# Patient Record
Sex: MALE | Race: WHITE | NOT HISPANIC OR LATINO | Employment: OTHER | URBAN - METROPOLITAN AREA
[De-identification: names, ages, dates, MRNs, and addresses within clinical notes are randomized per-mention and may not be internally consistent; named-entity substitution may affect disease eponyms.]

---

## 2017-12-12 ENCOUNTER — ALLSCRIPTS OFFICE VISIT (OUTPATIENT)
Dept: OTHER | Facility: OTHER | Age: 32
End: 2017-12-12

## 2017-12-15 NOTE — PROGRESS NOTES
not have any rectal plain when when he has the bleeding but he does have abdominal crampingHe does not currently have any abdominal pain or rectal bleeding      Active Problems  1  Rectal bleeding (569 3) (K62 5)    Past Medical History  Active Problems And Past Medical History Reviewed: The active problems and past medical history were reviewed and updated today  Surgical History  1  Denied: History Of Prior Surgery  Surgical History Reviewed: The surgical history was reviewed and updated today  Family History  Family History    1  Unknown family medical history  Family History Reviewed: The family history was reviewed and updated today  Social History  Social History Reviewed: The social history was reviewed and updated today  Vitals  Vital Signs    Recorded: [id number] 04:40PM   Temperature 99 F   Heart Rate 197   Systolic 284   Diastolic 84   Height 6 ft 2 in   Weight 325 lb 12 oz   BMI Calculated 41 82   BSA Calculated 2 67   O2 Saturation 94     Physical Exam   Constitutional  General appearance: No acute distress, well appearing and well nourished  obese  Eyes  Conjunctiva and lids: No swelling, erythema, or discharge  Ears, Nose, Mouth, and Throat  External inspection of ears and nose: Normal    Pulmonary  Respiratory effort: No increased work of breathing or signs of respiratory distress  Abdomen  Abdomen: Non-tender, no masses  Musculoskeletal  Gait and station: Normal    Skin  Skin and subcutaneous tissue: Normal without rashes or lesions     Psychiatric  Orientation to person, place and time: Normal    Mood and affect: Normal          Signatures   Electronically signed by : VERONICA Vasquez ; Dec 13 2017  1:58PM EST                       (Author)

## 2018-01-22 VITALS
WEIGHT: 315 LBS | OXYGEN SATURATION: 94 % | DIASTOLIC BLOOD PRESSURE: 84 MMHG | HEIGHT: 74 IN | TEMPERATURE: 99 F | BODY MASS INDEX: 40.43 KG/M2 | SYSTOLIC BLOOD PRESSURE: 144 MMHG | HEART RATE: 103 BPM

## 2018-02-16 ENCOUNTER — TRANSCRIBE ORDERS (OUTPATIENT)
Dept: GASTROENTEROLOGY | Facility: MEDICAL CENTER | Age: 33
End: 2018-02-16

## 2018-02-22 ENCOUNTER — OFFICE VISIT (OUTPATIENT)
Dept: GASTROENTEROLOGY | Facility: MEDICAL CENTER | Age: 33
End: 2018-02-22
Payer: COMMERCIAL

## 2018-02-22 VITALS — DIASTOLIC BLOOD PRESSURE: 86 MMHG | HEART RATE: 97 BPM | SYSTOLIC BLOOD PRESSURE: 124 MMHG | TEMPERATURE: 98.1 F

## 2018-02-22 DIAGNOSIS — K62.5 RECTAL BLEEDING: Primary | ICD-10-CM

## 2018-02-22 PROCEDURE — 99244 OFF/OP CNSLTJ NEW/EST MOD 40: CPT | Performed by: INTERNAL MEDICINE

## 2018-02-22 NOTE — PROGRESS NOTES
[facility] - Outpatient Consultation  Raza Bain 27 y o  male MRN: [id number]  Encounter: [id number]          ASSESSMENT AND PLAN:      There are no diagnoses linked to this encounter   ______________________________________________________________________    HPI:  ***      REVIEW OF SYSTEMS:    CONSTITUTIONAL: Denies any fever, chills, rigors, and weight loss  HEENT: No earache or tinnitus  Denies hearing loss or visual disturbances  CARDIOVASCULAR: No chest pain or palpitations  RESPIRATORY: Denies any cough, hemoptysis, shortness of breath or dyspnea on exertion  GASTROINTESTINAL: As noted in the History of Present Illness  GENITOURINARY: No problems with urination  Denies any hematuria or dysuria  NEUROLOGIC: No dizziness or vertigo, denies headaches  MUSCULOSKELETAL: Denies any muscle or joint pain  SKIN: Denies skin rashes or itching  ENDOCRINE: Denies excessive thirst  Denies intolerance to heat or cold  PSYCHOSOCIAL: Denies depression or anxiety  Denies any recent memory loss  Historical Information   No past medical history on file  No past surgical history on file  Social History   History   Alcohol use Not on file     History   Drug use: Unknown     History   Smoking Status    Not on file   Smokeless Tobacco    Not on file     No family history on file  Meds/Allergies     No current outpatient prescriptions on file  No Known Allergies        Objective     Blood pressure 124/86, pulse 97, temperature 98 1 °F (36 7 °C), temperature source Tympanic  PHYSICAL EXAM:      General Appearance:   Alert, cooperative, no distress   HEENT:   Normocephalic, atraumatic, anicteric      Neck:  Supple, symmetrical, trachea midline   Lungs:   Clear to auscultation bilaterally; no rales, rhonchi or wheezing; respirations unlabored    Heart[de-identified]   Regular rate and rhythm; no murmur, rub, or gallop     Abdomen:   Soft, non-tender, non-distended; normal bowel sounds; no masses, no organomegaly    Genitalia:   Deferred    Rectal:   Deferred    Extremities:  No cyanosis, clubbing or edema    Pulses:  2+ and symmetric    Skin:  No jaundice, rashes, or lesions    Lymph nodes:  No palpable cervical lymphadenopathy        Lab Results:   No visits with results within 1 Day(s) from this visit  Latest known visit with results is:   No results found for any previous visit  Radiology Results:   No results found  By signing my name below, I, [facility], attest that this documentation has been prepared under the direction and in the presence of Cinthia Burns DO  Electonically Signed: [facility] 2/22/2018      I, Cinthia Burns, personally performed the services described in this documenation  All medical record entries made by the scribe were at my direction and in my presence  I have reviewed the chart and discharge instructions (if applicable) and agree that the record reflects my personal performance and is accurate and complete  Cinthia Burns DO  2/22/2018

## 2018-02-22 NOTE — LETTER
February 22, 2018     Anastasiia Rapp MD  [id number]    Patient: Darrell Fuchs   YOB: 1985   Date of Visit: 2/22/2018       Dear Dr William Inch:    Thank you for referring Darrell Fuchs to me for evaluation  Below are my notes for this consultation  If you have questions, please do not hesitate to call me  I look forward to following your patient along with you           Sincerely,        Cinthia Burns,         CC: No Recipients
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
## 2018-03-14 ENCOUNTER — ANESTHESIA EVENT (OUTPATIENT)
Dept: GASTROENTEROLOGY | Facility: HOSPITAL | Age: 33
End: 2018-03-14
Payer: COMMERCIAL

## 2018-03-14 RX ORDER — SODIUM CHLORIDE 9 MG/ML
125 INJECTION, SOLUTION INTRAVENOUS CONTINUOUS
Status: CANCELLED | OUTPATIENT
Start: 2018-03-14

## 2018-03-15 ENCOUNTER — ANESTHESIA (OUTPATIENT)
Dept: GASTROENTEROLOGY | Facility: HOSPITAL | Age: 33
End: 2018-03-15
Payer: COMMERCIAL

## 2018-03-15 ENCOUNTER — HOSPITAL ENCOUNTER (OUTPATIENT)
Facility: HOSPITAL | Age: 33
Setting detail: OUTPATIENT SURGERY
Discharge: HOME | End: 2018-03-15
Attending: INTERNAL MEDICINE | Admitting: INTERNAL MEDICINE
Payer: COMMERCIAL

## 2018-03-15 VITALS
DIASTOLIC BLOOD PRESSURE: 88 MMHG | RESPIRATION RATE: 20 BRPM | BODY MASS INDEX: 40.43 KG/M2 | SYSTOLIC BLOOD PRESSURE: 151 MMHG | TEMPERATURE: 98.4 F | WEIGHT: 315 LBS | OXYGEN SATURATION: 96 % | HEIGHT: 74 IN | HEART RATE: 88 BPM

## 2018-03-15 DIAGNOSIS — K62.5 RECTAL BLEEDING: ICD-10-CM

## 2018-03-15 PROCEDURE — 88305 TISSUE EXAM BY PATHOLOGIST: CPT | Performed by: PATHOLOGY

## 2018-03-15 PROCEDURE — 45380 COLONOSCOPY AND BIOPSY: CPT | Performed by: INTERNAL MEDICINE

## 2018-03-15 RX ORDER — SODIUM CHLORIDE 9 MG/ML
125 INJECTION, SOLUTION INTRAVENOUS CONTINUOUS
Status: DISCONTINUED | OUTPATIENT
Start: 2018-03-15 | End: 2018-03-15 | Stop reason: HOSPADM

## 2018-03-15 RX ORDER — PROPOFOL 10 MG/ML
INJECTION, EMULSION INTRAVENOUS AS NEEDED
Status: DISCONTINUED | OUTPATIENT
Start: 2018-03-15 | End: 2018-03-15 | Stop reason: SURG

## 2018-03-15 RX ADMIN — LIDOCAINE HYDROCHLORIDE 100 MG: 20 INJECTION, SOLUTION INTRAVENOUS at 13:32

## 2018-03-15 RX ADMIN — PROPOFOL 100 MG: 10 INJECTION, EMULSION INTRAVENOUS at 13:37

## 2018-03-15 RX ADMIN — SODIUM CHLORIDE 125 ML/HR: 0.9 INJECTION, SOLUTION INTRAVENOUS at 12:58

## 2018-03-15 RX ADMIN — PROPOFOL 100 MG: 10 INJECTION, EMULSION INTRAVENOUS at 13:42

## 2018-03-15 RX ADMIN — PROPOFOL 200 MG: 10 INJECTION, EMULSION INTRAVENOUS at 13:32

## 2018-03-15 RX ADMIN — PROPOFOL 100 MG: 10 INJECTION, EMULSION INTRAVENOUS at 13:40

## 2018-03-15 NOTE — NURSING NOTE
No relief from sitting on toilet, pt walked back to bed, pt placed on all fours with buttocks in air, pt passed large amounts of flatus in this postion   Pt states his abd pain is a 2/10, pt reports "feeling much better "

## 2018-03-15 NOTE — OP NOTE
OPERATIVE REPORT  PATIENT NAME: Preston Sood    :  1985  MRN: [id number]  Pt Location: [facility] 01    SURGERY DATE: 3/15/2018    Surgeon(s) and Role:     * Raphael Palmer DO - Primary    Preop Diagnosis:  Rectal bleeding [K62 5]    Post-Op Diagnosis Codes:     * Rectal bleeding [K62 5]    Procedure(s) (LRB):  COLONOSCOPY with polypectomy (N/A)    Specimen(s):  ID Type Source Tests Collected by Time Destination   1 : polyp Tissue Large Intestine, Transverse Colon TISSUE EXAM Raphael Palmer DO 3/15/2018 1342        Estimated Blood Loss:   Minimal    Drains:       Anesthesia Type:   Choice    Operative Indications:  Rectal bleeding [K62 5]      Operative Findings:    Colonoscopy Procedure Note    Procedure: Colonoscopy    Sedation: Monitored anesthesia care, check anesthesia records      ASA Class: 2    INDICATIONS: Rectal Bleeding    POST-OP DIAGNOSIS: See the impression below    Procedure Details     Prior colonoscopy: No prior colonoscopy  Informed consent was obtained for the procedure, including sedation  Risks of perforation, hemorrhage, adverse drug reaction and aspiration were discussed  The patient was placed in the left lateral decubitus position  Based on the pre-procedure assessment, including review of the patient's medical history, medications, allergies, and review of systems, he had been deemed to be an appropriate candidate for conscious sedation; he was therefore sedated with the medications listed below  The patient was monitored continuously with telemetry, pulse oximetry, blood pressure monitoring, and direct observations  A rectal examination was performed  The colonoscope was inserted into the rectum and advanced under direct vision to the cecum, which was identified by the ileocecal valve and appendiceal orifice  The quality of the colonic preparation was good    A careful inspection was made as the colonoscope was withdrawn, including a retroflexed view of the rectum; findings and interventions are described below  Findings:  One polyp in the transverse colon  Biopsied with cold forceps  Complications:  None; patient tolerated the procedure well  Impression:    One polyp in the transverse colon  Biopsied with cold forceps  Recommendations:  Repeat colonoscopy in 5 years if polyp is an adenoma  rpeat colonoscopy is being recommended at an interval of less than 10 years, this is due to this polyp being an adenoma, otherwise would be at age 46      Lo Petersen DO  DATE: March 15, 2018  TIME: 2:03 PM

## 2018-03-15 NOTE — DISCHARGE INSTRUCTIONS
Findings:  One polyp in the transverse colon  Biopsied with cold forceps  Complications:  None; patient tolerated the procedure well      Impression:    One polyp in the transverse colon  Biopsied with cold forceps      Recommendations:  Repeat colonoscopy in 5 years if polyp is an adenoma  rpeat colonoscopy is being recommended at an interval of less than 10 years, this is due to this polyp being an adenoma, otherwise would be at age 46      SIGNATURE: Lynda Gonzalez, DO        Colorectal Polyps   WHAT YOU NEED TO KNOW:   Colorectal polyps are small growths of tissue in the lining of the colon and rectum  Most polyps are hyperplastic polyps and are usually benign (noncancerous)  Certain types of polyps, called adenomatous polyps, may turn into cancer  DISCHARGE INSTRUCTIONS:   Follow up with your healthcare provider or gastroenterologist as directed: You may need to return for more tests, such as another colonoscopy  Write down your questions so you remember to ask them during your visits  Reduce your risk for colorectal polyps:   · Eat a variety of healthy foods:  Healthy foods include fruit, vegetables, whole-grain breads, low-fat dairy products, beans, lean meat, and fish  Ask if you need to be on a special diet  · Maintain a healthy weight:  Ask your healthcare provider if you need to lose weight and how much you need to lose  Ask for help with a weight loss program     · Exercise:  Begin to exercise slowly and do more as you get stronger  Talk with your healthcare provider before you start an exercise program      · Limit alcohol:  Your risk for polyps increases the more you drink  · Do not smoke: If you smoke, it is never too late to quit  Ask for information about how to stop    For support and more information:   · Brenda Morrison ([facility]) [address], [place] [address]  Phone: 0- [phone] - [phone]  Web Address: www digestive  niddk nih gov  Contact your healthcare provider or gastroenterologist if:   · You have a fever  · You have chills, a cough, or feel weak and achy  · You have abdominal pain that does not go away or gets worse after you take medicine  · Your abdomen is swollen  · You are losing weight without trying  · You have questions or concerns about your condition or care  Seek care immediately or call 911 if:   · You have sudden shortness of breath  · You have a fast heart rate, fast breathing, or are too dizzy to stand up  · You have severe abdominal pain  · You see blood in your bowel movement  © 2017 [address] [employer]  Information is for End User's use only and may not be sold, redistributed or otherwise used for commercial purposes  All illustrations and images included in CareNotes® are the copyrighted property of A D [employer] M [employer] Inc  or [employer]  The above information is an  only  It is not intended as medical advice for individual conditions or treatments  Talk to your doctor, nurse or pharmacist before following any medical regimen to see if it is safe and effective for you  Colonoscopy   WHAT YOU NEED TO KNOW:   A colonoscopy is a procedure to examine the inside of your colon (intestine) with a scope  Polyps or tissue growths may have been removed during your colonoscopy  It is normal to feel bloated and to have some abdominal discomfort  You should be passing gas  If you have hemorrhoids or you had polyps removed, you may have a small amount of bleeding  DISCHARGE INSTRUCTIONS:   Seek care immediately if:   · You have a large amount of bright red blood in your bowel movements  · Your abdomen is hard and firm and you have severe pain  · You have sudden trouble breathing  Contact your healthcare provider if:   · You develop a rash or hives  · You have a fever within 24 hours of your procedure      · You have not had a bowel movement for 3 days after your procedure  · You have questions or concerns about your condition or care  Activity:   · Do not lift, strain, or run  for 3 days after your procedure  · Rest after your procedure  You have been given medicine to relax you  Do not  drive or make important decisions until the day after your procedure  Return to your normal activity as directed  · Relieve gas and discomfort from bloating  by lying on your right side with a heating pad on your abdomen  You may need to take short walks to help the gas move out  Eat small meals until bloating is relieved  If you had polyps removed: For 7 days after your procedure:  · Do not  take aspirin  · Do not  go on long car rides  Help prevent constipation:   · Eat a variety of healthy foods  Healthy foods include fruit, vegetables, whole-grain breads, low-fat dairy products, beans, lean meat, and fish  Ask if you need to be on a special diet  Your healthcare provider may recommend that you eat high-fiber foods such as cooked beans  Fiber helps you have regular bowel movements  · Drink liquids as directed  Adults should drink between 9 and 13 eight-ounce cups of liquid every day  Ask what amount is best for you  For most people, good liquids to drink are water, juice, and milk  · Exercise as directed  Talk to your healthcare provider about the best exercise plan for you  Exercise can help prevent constipation, decrease your blood pressure and improve your health  Follow up with your healthcare provider as directed:  Write down your questions so you remember to ask them during your visits  © 2017 [address] [employer] Information is for End User's use only and may not be sold, redistributed or otherwise used for commercial purposes  All illustrations and images included in CareNotes® are the copyrighted property of [employer]  or [employer] Farrell  The above information is an  only   It is not intended as medical advice for individual conditions or treatments  Talk to your doctor, nurse or pharmacist before following any medical regimen to see if it is safe and effective for you

## 2018-03-15 NOTE — NURSING NOTE
Pt c/o abd cramping, "not really passing air" pt oob and ambulated in hallway, sat on toilette to facilitate passing flatus  Will monitor
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
## 2018-03-15 NOTE — OP NOTE
OPERATIVE REPORT  PATIENT NAME: Levy Shah    :  1985  MRN: [id number]  Pt Location: [facility]    SURGERY DATE: 3/15/2018    Surgeon(s) and Role:     * Melanie Sow DO - Primary    Preop Diagnosis:  Rectal bleeding [K62 5]    Post-Op Diagnosis Codes:     * Rectal bleeding [K62 5]    Procedure(s) (LRB):  COLONOSCOPY with polypectomy (N/A)    Specimen(s):  ID Type Source Tests Collected by Time Destination   1 : polyp Tissue Large Intestine, Transverse Colon TISSUE EXAM Melanie DO Juanjose 3/15/2018 1342        Estimated Blood Loss:   Minimal    Drains:       Anesthesia Type:   Choice    Operative Indications:  Rectal bleeding [K62 5]      Operative Findings:  Colonoscopy Procedure Note    Procedure: Colonoscopy    Sedation: Monitored anesthesia care, check anesthesia records      ASA Class: 2    INDICATIONS: Rectal Bleeding    POST-OP DIAGNOSIS: See the impression below    Procedure Details     Prior colonoscopy: No prior colonoscopy  Informed consent was obtained for the procedure, including sedation  Risks of perforation, hemorrhage, adverse drug reaction and aspiration were discussed  The patient was placed in the left lateral decubitus position  Based on the pre-procedure assessment, including review of the patient's medical history, medications, allergies, and review of systems, he had been deemed to be an appropriate candidate for conscious sedation; he was therefore sedated with the medications listed below  The patient was monitored continuously with telemetry, pulse oximetry, blood pressure monitoring, and direct observations  A rectal examination was performed  The colonoscope was inserted into the rectum and advanced under direct vision to the cecum, which was identified by the ileocecal valve and appendiceal orifice  The quality of the colonic preparation was good    A careful inspection was made as the colonoscope was withdrawn, including a retroflexed view of the rectum; findings and interventions are described below  Findings:  Normal colon on direct and retroflexed views  Complications:  None; patient tolerated the procedure well  Impression:    Normal colon on direct and retroflexed views  Recommendations:  Repeat colonoscopy at age 46 for screening purposes  Bleeding was likely outlet in nature        SIGNATURE: Lucie Hand DO  DATE: March 15, 2018  TIME: 1:51 PM

## 2018-07-12 ENCOUNTER — OFFICE VISIT (OUTPATIENT)
Dept: GASTROENTEROLOGY | Facility: MEDICAL CENTER | Age: 33
End: 2018-07-12
Payer: COMMERCIAL

## 2018-07-12 VITALS
HEART RATE: 101 BPM | SYSTOLIC BLOOD PRESSURE: 136 MMHG | DIASTOLIC BLOOD PRESSURE: 82 MMHG | TEMPERATURE: 98.4 F | WEIGHT: 315 LBS | BODY MASS INDEX: 40.43 KG/M2 | HEIGHT: 74 IN

## 2018-07-12 DIAGNOSIS — K62.5 RECTAL BLEEDING: ICD-10-CM

## 2018-07-12 DIAGNOSIS — R10.9 ABDOMINAL PAIN, UNSPECIFIED ABDOMINAL LOCATION: Primary | ICD-10-CM

## 2018-07-12 PROCEDURE — 99214 OFFICE O/P EST MOD 30 MIN: CPT | Performed by: PHYSICIAN ASSISTANT

## 2018-07-12 NOTE — PROGRESS NOTES
[facility] - Outpatient Follow-up Note  Yovani Correa 26 y o  male MRN: [id number]  Encounter: [id number]          ASSESSMENT AND PLAN:      1  Abdominal pain:  Admits to left-sided abdominal pain, occurred with lifting heavy objects  This is most likely secondary to musculoskeletal source however, patient is very concerned  Recommend trying Lidoderm patch, Tylenol     - CT abdomen pelvis w contrast; Future  - CBC and differential  - Comprehensive metabolic panel    2  Rectal bleeding:  Continues to complain of intermittent rectal bleeding  He underwent colonoscopy on March 15, 2018, which was normal except for 1 polyp in the transverse colon  This could be secondary to anorectal source as he states he was straining during times of bleeding  -CT scan abdomen  -CBC  -CMP  -recommend MiraLax to avoid constipation and straining    3  Colon cancer screening:  Colonoscopy done March 2018 with tubular adenoma  Repeat recommended in 5 years  -repeat colonoscopy in 2023    ______________________________________________________________________    SUBJECTIVE:  Naveen Coulter is a 27 yo male who is here for follow-up for rectal bleeding after colonoscopy  He is originally referred by his PCP after reporting intermittent rectal bleeding  He underwent a colonoscopy in March 15th 2018, which was normal except for 1 tubular adenoma  Repeat recommended in 5 years  States that he continues to experience this intermittent rectal bleeding and complains of left-sided abdominal pain after lifting heavy objects  He does complain of reflux in the mornings but states that this is relieved by urinating  REVIEW OF SYSTEMS IS OTHERWISE NEGATIVE        Historical Information   Past Medical History:   Diagnosis Date    Abdominal pain     Hx of bleeding disorder     Morbid obesity ([address] )     Rectal bleed      Past Surgical History:   Procedure Laterality Date    COLONOSCOPY N/A 3/15/2018 Procedure: COLONOSCOPY with polypectomy;  Surgeon: Leena Burgos DO;  Location: [facility]; Service: Gastroenterology    NO PAST SURGERIES       Social History   History   Alcohol Use No     History   Drug Use No     History   Smoking Status    Never Smoker   Smokeless Tobacco    Never Used     No family history on file  Meds/Allergies     No current outpatient prescriptions on file  No Known Allergies        Objective     Blood pressure 136/82, pulse 101, temperature 98 4 °F (36 9 °C), temperature source Tympanic Core, height 6' 2" (1 88 m), weight (!) 145 kg (319 lb)  Body mass index is 40 96 kg/m²  PHYSICAL EXAM:      General Appearance:   Alert, cooperative, no distress   HEENT:   Normocephalic, atraumatic, anicteric      Neck:  Supple, symmetrical, trachea midline   Lungs:   Clear to auscultation bilaterally; no rales, rhonchi or wheezing; respirations unlabored    Heart[de-identified]   Regular rate and rhythm; no murmur, rub, or gallop  Abdomen:   Soft, left sided abdominal pain, non-distended; normal bowel sounds; no masses, no organomegaly    Genitalia:   Deferred    Rectal:   Deferred    Extremities:  No cyanosis, clubbing or edema        Skin:  No jaundice, rashes, or lesions          Lab Results:   No visits with results within 1 Day(s) from this visit     Latest known visit with results is:   Admission on 03/15/2018, Discharged on 03/15/2018   Component Date Value    Case Report 03/15/2018                      Value:Surgical Pathology Report                         Case: [id number]                                   Authorizing Provider:  Leena Burgos DO        Collected:           03/15/2018 1342              Ordering Location:     [facility]        Received:            03/15/2018 [address] [place] [address] Endoscopy                                                          Pathologist:           J Luis Martinez MD Specimen:    Large Intestine, Transverse Colon, polyp                                                   Final Diagnosis 03/15/2018                      Value: This result contains rich text formatting which cannot be displayed here   Additional Information 03/15/2018                      Value: This result contains rich text formatting which cannot be displayed here  Bora Kenny Gross Description 03/15/2018                      Value: This result contains rich text formatting which cannot be displayed here  Radiology Results:   No results found

## 2018-07-27 ENCOUNTER — TELEPHONE (OUTPATIENT)
Dept: GASTROENTEROLOGY | Facility: MEDICAL CENTER | Age: 33
End: 2018-07-27

## 2018-07-27 NOTE — TELEPHONE ENCOUNTER
TROY called stating he needs a peer to peer for his ct scan abd and pelvis  Patient of Dr Parth Merida  Number to call is [phone]  Tracking # [id number]

## 2018-08-01 DIAGNOSIS — R10.12 LEFT UPPER QUADRANT PAIN: Primary | ICD-10-CM

## 2018-08-01 NOTE — TELEPHONE ENCOUNTER
Hi, I called and the physician would like to approve however, given patient is a medicaid patient the order has   I entered a new order  This may need a peer to peer again in the future   Thank you

## 2018-11-01 ENCOUNTER — TELEPHONE (OUTPATIENT)
Dept: GASTROENTEROLOGY | Facility: CLINIC | Age: 33
End: 2018-11-01

## 2018-11-01 NOTE — TELEPHONE ENCOUNTER
Called patient, spoke to Marry Lechuga about scheduling the CT scan abdomen and pelvis w cotrast order by Magui Calderon so that we could start a prior authorization  Patient refused to schedule it  Financial reason

## 2019-07-31 ENCOUNTER — TELEPHONE (OUTPATIENT)
Dept: FAMILY MEDICINE CLINIC | Facility: CLINIC | Age: 34
End: 2019-07-31

## 2019-07-31 DIAGNOSIS — Z13.29 SCREENING FOR THYROID DISORDER: ICD-10-CM

## 2019-07-31 DIAGNOSIS — Z13.1 SCREENING FOR DIABETES MELLITUS: ICD-10-CM

## 2019-07-31 DIAGNOSIS — Z13.0 SCREENING, ANEMIA, DEFICIENCY, IRON: Primary | ICD-10-CM

## 2019-07-31 DIAGNOSIS — Z13.220 SCREENING FOR LIPID DISORDERS: ICD-10-CM

## 2019-07-31 NOTE — TELEPHONE ENCOUNTER
Please call the patient and schedule a routine physical/health maintenance visit  I ordered routine blood work to [employer]   The pt should have the blood work done prior to the visit  He has only ever been seen here once in 2017  Please confirm he still plans to come here as a patient

## 2019-11-13 ENCOUNTER — TELEPHONE (OUTPATIENT)
Dept: FAMILY MEDICINE CLINIC | Facility: CLINIC | Age: 34
End: 2019-11-13

## 2019-12-07 NOTE — TELEPHONE ENCOUNTER
Rigo Vinson spoke with the patient yesterday  He no longer has insurance that we take    He does not have [facility]

## 2022-01-27 ENCOUNTER — OFFICE VISIT (OUTPATIENT)
Dept: OBGYN CLINIC | Facility: CLINIC | Age: 37
End: 2022-01-27
Payer: COMMERCIAL

## 2022-01-27 VITALS
DIASTOLIC BLOOD PRESSURE: 86 MMHG | HEIGHT: 74 IN | BODY MASS INDEX: 40.43 KG/M2 | WEIGHT: 315 LBS | SYSTOLIC BLOOD PRESSURE: 140 MMHG

## 2022-01-27 DIAGNOSIS — S61.412A LACERATION OF SKIN OF LEFT HAND, INITIAL ENCOUNTER: Primary | ICD-10-CM

## 2022-01-27 PROCEDURE — 99204 OFFICE O/P NEW MOD 45 MIN: CPT | Performed by: PHYSICIAN ASSISTANT

## 2022-01-27 NOTE — PROGRESS NOTES
Orthopaedic Surgery - Office Note  Nadira Arechiga (79 y o  male)   : 1985   MRN: [id number]  Encounter Date: 2022    No chief complaint on file  Chief complaint is left hand laceration that occurred on 2022 with a reopening of the laceration repair Monday of this week  Assessment/Plan  Diagnoses and all orders for this visit:    Laceration of skin of left hand, initial encounter  Comments:  Status post closure on 2022 at [facility] and then again at [facility] after re-rupture of laceration and suture line  Orders:  -     Durable Medical Equipment    Patient will watch for signs of infection and present to the nearest emergency department if they occur  Advise the patient on appropriate wound care  He will use warm soapy water to clean the laceration area daily  He will discontinue use of the hydrogen peroxide which he had been using  I had a long discussion with him in regards to signs of infection he will proceed to the emergency department if they occur  He will continue the oral antibiotics which he is currently taking  He reports he still has a refill to use  Return early next week for wound check, possible OT splint fabrication  Plan will hopefully be to recheck the wound on Monday whenever Dr Socorro Aj  would be in the office if there is any concerns for worsening of his condition  We will consider referral to occupational therapy at that time for a volar based hand splint if needed      History of Present Illness  This is a new patient to me  Patient was injured on 2022 when he cut his left hand on a metal edge  He presented to [facility] where he had a laceration repair  It was noted in their notes he was up-to-date on his tetanus  He was started on Keflex at the time    Patient reports that the suture and laceration then opened back up any presented to [facility] where he reports it was repaired  He was advised to follow-up with Orthopedics to make sure it was not infected  He presents today for evaluation and treatment  Patient denies any fever chills or drainage from the wound  He has been using the splint and cleaning it with hydrogen peroxide  Reports he is up-to-date on his tetanus shots  Reports that it was a metal judit iron fence that cut his hand open  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  He states he cannot stop working and must continue to use the hand  He reports that he needs to have some sort of brace on it so he does not tear it open again  Review of Systems  Pertinent items are noted in HPI  All other systems were reviewed and are negative  Physical Exam  /86   Ht 6' 2" (1 88 m)   Wt (!) 149 kg (328 lb)   BMI 42 11 kg/m²   Cons: Appears well  No apparent distress  Psych: Alert  Oriented x3  Mood and affect normal   Eyes: PERRLA, EOMI  Resp: Normal effort  No audible wheezing or stridor  CV: Palpable pulse  No discernable arrhythmia  Lymph:  No palpable cervical, axillary, or inguinal lymphadenopathy  Skin: Warm  No palpable masses  No visible lesions  Neuro: Normal muscle tone  Normal and symmetric DTR's  Patient has a large v-shaped palmar laceration approximately 13-14 cm in length  Wound margin are not perfectly aligned with some gapping  No soft tissue, fascial, adipose tissue was visualized  Tendon and nerves are not visualized  There is no signs of active infection  Multiple nylon sutures remain  White granulation tissue is appreciated  He does have a dirty hand and fingers  He has a very dirty splint and dressing which he reports has been in place since Monday at [facility]     Patient is able to actively flex and extend all digits  Neurovascularly he is intact without any deficits appreciated  Capillary refills within normal limits      Patient's wound was cleaned extensively in the office today by myself with a dry dressing being reapplied after triple antibiotic ointment was applied  He was then placed in a cock-up wrist splint as he states he needs to have some support as he intends to continue to use his hand  He reports he is unable to obtain his dural time records  Addendum(1-31-22 at 3:35) to the office visit  I followed up with the patient after he was seen earlier today to remove his sutures without complication  The large laceration is healing with granulation tissue noted  There are no signs of infection  It appears overall improved from the last time I saw at  There is no active infection  I will refill his antibiotics and recheck him in 1 week  He was again advised to return earlier if any signs of infection or worsening symptoms develop  We have progressed to a Coban pressure dressing now that all sutures have been removed as they were no longer functioning  Studies Reviewed  [facility] everywhere notes were reviewed by myself in the office today:    There is a curved laceration measuring approximately 13 cm in length on the left hand  Examination of the wound for foreign bodies and devitalized tissue showed none  Examination of the surrounding area for neural or vascular damage showed none    Imaging studies reports from [facility] everywhere were reviewed    No sutures were removed today  Procedures  No procedures today  Medical, Surgical, Family, and Social History  The patient's medical history, family history, and social history, were reviewed and updated as appropriate  Past Medical History:   Diagnosis Date    Abdominal pain     Hx of bleeding disorder     Morbid obesity ([address] )     Rectal bleed        Past Surgical History:   Procedure Laterality Date    COLONOSCOPY N/A 3/15/2018    Procedure: COLONOSCOPY with polypectomy;  Surgeon: Rosemary Quesada DO;  Location: [facility];   Service: Gastroenterology    NO PAST SURGERIES         History reviewed  No pertinent family history  Social History     Occupational History    Not on file   Tobacco Use    Smoking status: Never Smoker    Smokeless tobacco: Never Used   Substance and Sexual Activity    Alcohol use: No    Drug use: No    Sexual activity: Not on file       No Known Allergies    No current outpatient medications on file        Demetria Combs PA-C

## 2022-01-31 ENCOUNTER — OFFICE VISIT (OUTPATIENT)
Dept: OBGYN CLINIC | Facility: CLINIC | Age: 37
End: 2022-01-31
Payer: COMMERCIAL

## 2022-01-31 ENCOUNTER — TELEPHONE (OUTPATIENT)
Dept: OBGYN CLINIC | Facility: CLINIC | Age: 37
End: 2022-01-31

## 2022-01-31 DIAGNOSIS — S61.412A LACERATION OF SKIN OF LEFT HAND, INITIAL ENCOUNTER: Primary | ICD-10-CM

## 2022-01-31 PROCEDURE — 99212 OFFICE O/P EST SF 10 MIN: CPT | Performed by: PHYSICIAN ASSISTANT

## 2022-01-31 RX ORDER — DOXYCYCLINE HYCLATE 100 MG
100 TABLET ORAL 2 TIMES DAILY
Qty: 20 TABLET | Refills: 0 | Status: SHIPPED | OUTPATIENT
Start: 2022-01-31 | End: 2022-02-10

## 2022-01-31 NOTE — PROGRESS NOTES
Orthopaedic Surgery - Office Note  Milan Ramirez (80 y o  male)   : 1985   MRN: [id number]  Encounter Date: 2022    No chief complaint on file  Chief complaint left hand recheck and suture removal      Assessment/Plan  Diagnoses and all orders for this visit:    Laceration of skin of left hand, initial encounter    Patient was again educated on appropriate skin care for the healing wound  He will continue to avoid using hydrogen peroxide  Warm soapy water is recommended  He will keep the laceration clean dry and covered at all times continuing use of the splint  Patient will continue on the prophylactic oral antibiotics he was provided a refill of them today  Patient was seen by Dr Karol Rodriguez as well and I agree with plan to proceed with a wound management referral     We will continue close follow-up with a recheck in 1 week to monitor for healing    Patient was again educated on appropriate reasons to proceed to the emergency department  No follow-ups on file  History of Present Illness  This is a previous patient here for a recheck of his hand laceration  Patient is well known known to me  He has continued to take the antibiotic  He denies any new symptoms  He denies any fever chills foul odor or drainage from the laceration  He has been tolerating the splint well and has continued to work limiting the activity that he was doing as able while still providing food for my family  Patient reports a few days left of his antibiotic he reports he is taking doxycycline twice daily    Review of Systems  Pertinent items are noted in HPI  All other systems were reviewed and are negative  Physical Exam  There were no vitals taken for this visit  Cons: Appears well  No apparent distress  Psych: Alert  Oriented x3  Mood and affect normal   Eyes: PERRLA, EOMI  Resp: Normal effort  No audible wheezing or stridor  CV: Palpable pulse  No discernable arrhythmia     Lymph:  No palpable cervical, axillary, or inguinal lymphadenopathy  Skin: Warm  No palpable masses  No visible lesions  Neuro: Normal muscle tone  Normal and symmetric DTR's  Patient's hand laceration is improved compared to last visit  Granulation tissue is noted  Wound margins remain malaligned  No active infection is seen  The sutures that remain are nonfunctioning at this time  They were removed  Without complication  No drainage is noted  Patient's flexion and extension function of all digits remain within normal limits with 5/5 strength  Sensation is intact to light touch in all digits  Slight decrease in sensation just distal to the laceration at the 2nd and 3rd digit base  No signs for need of immediate surgery at this time  Studies Reviewed      Procedures  No procedures today  Medical, Surgical, Family, and Social History  The patient's medical history, family history, and social history, were reviewed and updated as appropriate  Past Medical History:   Diagnosis Date    Abdominal pain     Hx of bleeding disorder     Morbid obesity ([address] )     Rectal bleed        Past Surgical History:   Procedure Laterality Date    COLONOSCOPY N/A 3/15/2018    Procedure: COLONOSCOPY with polypectomy;  Surgeon: Hoa Negrete DO;  Location: [facility]; Service: Gastroenterology    NO PAST SURGERIES         No family history on file      Social History     Occupational History    Not on file   Tobacco Use    Smoking status: Never Smoker    Smokeless tobacco: Never Used   Substance and Sexual Activity    Alcohol use: No    Drug use: No    Sexual activity: Not on file       No Known Allergies      Current Outpatient Medications:     cephalexin (KEFLEX) 500 mg capsule, , Disp: , Rfl:     doxycycline hyclate (VIBRA-TABS) 100 mg tablet, Take 1 tablet (100 mg total) by mouth 2 (two) times a day for 10 days, Disp: 20 tablet, Rfl: 0      Brayden Brown PA-C

## 2022-02-01 DIAGNOSIS — S61.412A LACERATION OF SKIN OF LEFT HAND, INITIAL ENCOUNTER: Primary | ICD-10-CM

## 2022-02-01 NOTE — PROGRESS NOTES
Order was placed for wound care with the patient being called to advised to expect a phone call  I did review with the patient he has no new symptoms and states it is still doing well without any drainage  He denies any fever or chills and has his antibiotics  He will await the call to have the appointment scheduled

## 2022-02-04 ENCOUNTER — TELEMEDICINE (OUTPATIENT)
Dept: WOUND CARE | Facility: CLINIC | Age: 37
End: 2022-02-04
Payer: COMMERCIAL

## 2022-02-04 DIAGNOSIS — S61.412A LACERATION OF LEFT HAND WITHOUT FOREIGN BODY, INITIAL ENCOUNTER: Primary | ICD-10-CM

## 2022-02-04 PROCEDURE — 99203 OFFICE O/P NEW LOW 30 MIN: CPT | Performed by: FAMILY MEDICINE

## 2022-02-04 NOTE — PROGRESS NOTES
Virtual Regular Visit    Verification of patient location:    Patient is located in the following state in which I hold an active license [place]      Assessment/Plan:    Problem List Items Addressed This Visit        Other    Laceration of left hand without foreign body - Primary     Initial evaluation, evaluated to the best of my ability via telehealth conference  No clinical signs of infection  Ok to continue wound management with triple antibiotic ointment  Keep the wound covered at all times, never leave open to air except when showering and changing the dressing  Do not use any harsh cleansers such as alcohol, peroxide, or antibacterial soap  Do not submerge in any body of water such as bath tub, hot tub, or swimming pool  Followup with me on Monday as scheduled at the [address]                    Reason for visit is   Chief Complaint   Patient presents with    Teleheath     Open wound hand        Encounter provider Drake Adorno DO    Provider located at [address]      Recent Visits  No visits were found meeting these conditions  Showing recent visits within past 7 days and meeting all other requirements  Today's Visits  Date Type Provider Dept   02/04/22 Telemedicine Drake Adorno DO [address] [place] [address]   Showing today's visits and meeting all other requirements  Future Appointments  No visits were found meeting these conditions  Showing future appointments within next 150 days and meeting all other requirements       The patient was identified by name and date of birth  Zaki Washburn was informed that this is a telemedicine visit and that the visit is being conducted through [employer] and patient was informed that this is a secure, HIPAA-compliant platform  He agrees to proceed     Other methods to assure confidentiality were taken All patient visits were done for the day so nurses and  staff were the only individuals present during the visit    No one else was in the room  He acknowledged consent and understanding of privacy and security of the video platform  The patient has agreed to participate and understands they can discontinue the visit at any time  Patient is aware this is a billable service  Subjective  Milan Ramirez is a 43 y o  male    Virtual visit for initial evaluation of a traumatic left hand wound that has been present since 01/17/2022 after cutting it on a metal fence  He was seen at [address] [place] [address] that day at which time sutures were placed  Patient reports that about 5-7 days later some of the sutures came out after a fall  He then presented to [address] [place] [address] at which time new sutures were placed and he was told to followup with ortho  He then saw ortho PA on 01/27 and then had sutures removed on 01/31 by same PA  Patient was initially cleansing the wound with alcohol and peroxide but stopped after 1/31, following the recommendations of Dr Alecia Taylor, whom patient also saw that day  Patient has been using triple antibiotic ointment on the wound since the initial traumatic event on 01/17  Patient is currently taking doxycycline as directed by ortho PA  He is using soap and water to cleanse the wound and reports that he has never leaving it open to air  No diabetes  No smoking  Patient reports small amount of drainage, changing the dressing typically around 2 times per day  Past Medical History:   Diagnosis Date    Abdominal pain     Hx of bleeding disorder     Morbid obesity ([address] )     Rectal bleed        Past Surgical History:   Procedure Laterality Date    COLONOSCOPY N/A 3/15/2018    Procedure: COLONOSCOPY with polypectomy;  Surgeon: Brian Padilla DO;  Location: [facility];   Service: Gastroenterology    NO PAST SURGERIES         Current Outpatient Medications   Medication Sig Dispense Refill    cephalexin (KEFLEX) 500 mg capsule       doxycycline hyclate (VIBRA-TABS) 100 mg tablet Take 1 tablet (100 mg total) by mouth 2 (two) times a day for 10 days 20 tablet 0     No current facility-administered medications for this visit  No Known Allergies    Review of Systems   Constitutional: Negative for chills and fever  HENT: Negative for congestion and sneezing  Respiratory: Negative for cough  Skin: Positive for wound  Psychiatric/Behavioral: Negative for agitation  Video Exam    There were no vitals filed for this visit  Physical Exam  Constitutional:       General: He is not in acute distress  Appearance: Normal appearance  He is obese  He is not ill-appearing, toxic-appearing or diaphoretic  HENT:      Head: Normocephalic and atraumatic  Right Ear: External ear normal       Left Ear: External ear normal    Eyes:      Conjunctiva/sclera: Conjunctivae normal    Pulmonary:      Effort: Pulmonary effort is normal  No respiratory distress  Musculoskeletal:      Left hand: Laceration present  Cervical back: Neck supple  Comments: Normal range of motion of the digits of the left hand   Skin:     Comments: Wound difficult to completely visualized but appears to be through to subcutaneous tissue, no deeper structures appear to be exposed  There is no periwound erythema  Drainage appears to be small, serosanguineous  Wound is on the palmar surface of the left hand at the base of the 2nd digit   Neurological:      Mental Status: He is alert  Psychiatric:         Mood and Affect: Mood normal          Behavior: Behavior normal           I spent 20 minutes directly with the patient during this visit    [facility] verb[facility] to participate in [employer]   Pt is aware that [employer] could be limited without vital signs or the ability to perform a full hands-on physical exam  Dayton Molina Cea understands he or the provider may request at any time to terminate the video visit and request the patient to seek care or treatment in person

## 2022-02-04 NOTE — ASSESSMENT & PLAN NOTE
Initial evaluation, evaluated to the best of my ability via telehealth conference  No clinical signs of infection  Ok to continue wound management with triple antibiotic ointment  Keep the wound covered at all times, never leave open to air except when showering and changing the dressing  Do not use any harsh cleansers such as alcohol, peroxide, or antibacterial soap  Do not submerge in any body of water such as bath tub, hot tub, or swimming pool  Followup with me on Monday as scheduled at the [address]

## 2022-02-07 ENCOUNTER — OFFICE VISIT (OUTPATIENT)
Dept: OBGYN CLINIC | Facility: CLINIC | Age: 37
End: 2022-02-07
Payer: COMMERCIAL

## 2022-02-07 ENCOUNTER — OFFICE VISIT (OUTPATIENT)
Dept: OCCUPATIONAL THERAPY | Facility: CLINIC | Age: 37
End: 2022-02-07
Payer: COMMERCIAL

## 2022-02-07 VITALS
DIASTOLIC BLOOD PRESSURE: 80 MMHG | HEIGHT: 74 IN | SYSTOLIC BLOOD PRESSURE: 130 MMHG | WEIGHT: 315 LBS | BODY MASS INDEX: 40.43 KG/M2

## 2022-02-07 DIAGNOSIS — S61.412D LACERATION OF SKIN OF LEFT HAND, SUBSEQUENT ENCOUNTER: ICD-10-CM

## 2022-02-07 DIAGNOSIS — S61.412D LACERATION OF SKIN OF LEFT HAND, SUBSEQUENT ENCOUNTER: Primary | ICD-10-CM

## 2022-02-07 PROCEDURE — 97760 ORTHOTIC MGMT&TRAING 1ST ENC: CPT | Performed by: OCCUPATIONAL THERAPIST

## 2022-02-07 PROCEDURE — 99213 OFFICE O/P EST LOW 20 MIN: CPT | Performed by: PHYSICIAN ASSISTANT

## 2022-02-07 NOTE — PROGRESS NOTES
Orthopaedic Surgery - Office Note  Socorro Webb (11 y o  male)   : 1985   MRN: [id number]  Encounter Date: 2022    Chief Complaint   Patient presents with    Left Hand - Follow-up         Assessment/Plan  Diagnoses and all orders for this visit:    Laceration of skin of left hand, subsequent encounter  -     Ambulatory Referral to Occupational Therapy; Future    Seen by Attending Dr Jamie Larios as well:    Patient will continue appropriate wound care which was reviewed again today in the office  [facility]  Watch for signs of infection in proceed immediately to the emergency department if they occur  Finish course of antibiotic, but no new prescription is needed    Patient will be seen in the occupational therapy department for custom orthosis to provide maximum protection and allow for healing and then once a week for manipulation of scar tissue    Conservative vs  Surgical treatment options reviewed with patient office today  No surgery needed at this time, but may be considered if the conservative treatment of occupational therapy does not resolve his symptoms and patient is not happy with the wound healing  Estimated length of disability would be 3-4 weeks out of work from the surgery  Return 4 weeks with Dr Jamie Larios  History of Present Illness  Patient is here for hand laceration recheck  Patient had a telemedicine visit with wound care  Patient reports the drainage has resolved  He denies any fever chills or odor from the laceration  He reports the pain has minimal eyes and there is no anesthesia and any location in the hand  He denies any numbness or tingling  He has been doing warm soapy water cleansing and not been using alcohol or hydrogen peroxide anymore  Review of Systems  Pertinent items are noted in HPI  All other systems were reviewed and are negative      Physical Exam  /80   Ht 6' 2" (1 88 m)   Wt Electa Bradley ) 149 kg (328 lb)   BMI 42 11 kg/m²   Cons: Appears well  No apparent distress  Psych: Alert  Oriented x3  Mood and affect normal   Eyes: PERRLA, EOMI  Resp: Normal effort  No audible wheezing or stridor  CV: Palpable pulse  No discernable arrhythmia  Lymph:  No palpable cervical, axillary, or inguinal lymphadenopathy  Skin: Warm  No palpable masses  No visible lesions  Neuro: Normal muscle tone  Normal and symmetric DTR's  Left hand laceration has no signs of active infection  Granulation tissue was noted  There is no drainage  Neurovascularly the patient is fully intact  He has full active and passive range of motion at all digits  Strength is 5/5 at all digits  Wound margins are malaligned with granulation tissue showing signs of healing  He is nontender to palpation  Studies Reviewed  Wound care office notes were reviewed  Procedures  No procedures today  Medical, Surgical, Family, and Social History  The patient's medical history, family history, and social history, were reviewed and updated as appropriate  Past Medical History:   Diagnosis Date    Abdominal pain     Hx of bleeding disorder     Morbid obesity ([address] )     Rectal bleed        Past Surgical History:   Procedure Laterality Date    COLONOSCOPY N/A 3/15/2018    Procedure: COLONOSCOPY with polypectomy;  Surgeon: Naveed Ferraro DO;  Location: [facility]; Service: Gastroenterology    NO PAST SURGERIES         History reviewed  No pertinent family history      Social History     Occupational History    Not on file   Tobacco Use    Smoking status: Never Smoker    Smokeless tobacco: Never Used   Substance and Sexual Activity    Alcohol use: No    Drug use: No    Sexual activity: Not on file       No Known Allergies      Current Outpatient Medications:     cephalexin (KEFLEX) 500 mg capsule, , Disp: , Rfl:     doxycycline hyclate (VIBRA-TABS) 100 mg tablet, Take 1 tablet (100 mg total) by mouth 2 (two) times a day for 10 days, Disp: 20 tablet, Rfl: 0      Brayden Brown PA-C Risks/benefits discussed with patient/surrogate

## 2022-02-07 NOTE — PROGRESS NOTES
Orthosis    Diagnosis:   1  Laceration of skin of left hand, subsequent encounter  Ambulatory Referral to Occupational Therapy     Indication: wound protection    Location: Left  hand  Supplies: Custom Fit Orthotic  Orthosis type: hand clamshell  Wearing Schedule: With Activity Only  Describe Position: function    Precautions: Universal (skin contact/breakdown)    Patient or Caregiver expresses understanding of wearing Schedule and Precautions? Yes  Patient or Caregiver able to don/doff orthotic independently? Yes    Written orders provided to patient? Yes  Orders Obtained: Written  Orders Obtained from: Ulises Massey    Return for evaluation and treatment Recommend 1 x wk for scar management    Pt will call to schedule

## 2022-02-15 ENCOUNTER — EVALUATION (OUTPATIENT)
Dept: OCCUPATIONAL THERAPY | Facility: CLINIC | Age: 37
End: 2022-02-15
Payer: COMMERCIAL

## 2022-02-15 DIAGNOSIS — S61.412D LACERATION OF SKIN OF LEFT HAND, SUBSEQUENT ENCOUNTER: Primary | ICD-10-CM

## 2022-02-15 PROCEDURE — 97165 OT EVAL LOW COMPLEX 30 MIN: CPT | Performed by: OCCUPATIONAL THERAPIST

## 2022-02-15 PROCEDURE — 97140 MANUAL THERAPY 1/> REGIONS: CPT | Performed by: OCCUPATIONAL THERAPIST

## 2022-02-15 NOTE — PROGRESS NOTES
OT Evaluation     Today's date: 2/15/2022  Patient name: Iesha Joseph  : 1985  MRN: [id number]  Referring provider: AIMEE Perea*  Dx:   Encounter Diagnosis     ICD-10-CM    1  Laceration of skin of left hand, subsequent encounter  S61 412D                   Assessment  Assessment details: Taylor Marin presents s/p laceration of L hand   He has a raised scar with a flap   He has local tenderness  He has good ROM   He has nerve sensitivity   He has mild edema   He has weak  compared to his R   He works as a  and struggles with heavy activity  Impairments: lacks appropriate home exercise program and pain with function  Functional limitations: pain with gripping , pulling , wt bearing   Symptom irritability: lowUnderstanding of Dx/Px/POC: good   Prognosis: good    Goals  STG- 1 wk  1- I with scar STM  2- improve  10#    LTG- 4 wks  1- no pain  2-increase L  30#  3- flatten scar     Plan  Patient would benefit from: OT eval and skilled occupational therapy  Planned modality interventions: thermotherapy: hydrocollator packs and ultrasound  Planned therapy interventions: joint mobilization, manual therapy, massage, home exercise program, therapeutic activities, stretching and flexibility  Other planned therapy interventions: elastomer  Frequency: 1x week  Duration in weeks: 4  Plan of Care beginning date: 2/15/2022        Subjective Evaluation    History of Present Illness  Date of onset: 2022  Mechanism of injury: trauma  Mechanism of injury: Lacerated L volar hand on a iron fence  He was seen at [facility] for sutures    Quality of life: fair    Pain  Current pain ratin  At best pain ratin  At worst pain rating: 3  Location: scar   Quality: dull ache  Progression: improved    Social Support  Steps to enter house: yes  Stairs in house: yes   Lives in: multiple-level home  Lives with: spouse and young children    Employment status: working  Treatments  No previous or current treatments  Patient Goals  Patient goals for therapy: decreased edema, decreased pain and increased strength  Patient goal: regain full use L hand         Objective     Observations     Additional Observation Details  7 cm scar volar L hand at [facility] index, middle-   raised flap     Palpation     Additional Palpation Details  Tender scar    Neurological Testing     Additional Neurological Details  Occasional parasthesias ulnar index, radial middle fingers    Active Range of Motion     Left Wrist   Normal active range of motion    Left Digits    Flexion   Index     MCP: 65    PIP: 90    DIP: 65  Middle     MCP: 65    PIP: 90    DIP: 80  Extension   Index     MCP: 0    PIP: 0    DIP: 0  Middle     MCP: 0    PIP: 0    DIP: 0    Strength/Myotome Testing     Left Wrist/Hand      (2nd hand position)     Trial 1: 55    Thumb Strength  Key/Lateral Pinch     Trial 1: 26  Palmar/Three-Point Pinch     Trial 1: 30    Right Wrist/Hand      (2nd hand position)     Trial 1: 80    Thumb Strength   Key/Lateral Pinch     Trial 1: 30  Palmar/Three-Point Pinch     Trial 1: 16    Swelling     Left Wrist/Hand   Circumference MCP: 23 5 cm    Right Wrist/Hand   Circumference MCP: 23 cm             Precautions: universal       Manuals 2/15            graston scar  4m            retrograde 4m                         Elastomer scar  2m            HEP  Scar management                                                                                                       Ther Ex                                                                                                                     Ther Activity                                       Gait Training                                       Modalities

## 2022-02-22 ENCOUNTER — OFFICE VISIT (OUTPATIENT)
Dept: OCCUPATIONAL THERAPY | Facility: CLINIC | Age: 37
End: 2022-02-22
Payer: COMMERCIAL

## 2022-02-22 DIAGNOSIS — S61.412D LACERATION OF SKIN OF LEFT HAND, SUBSEQUENT ENCOUNTER: Primary | ICD-10-CM

## 2022-02-22 PROCEDURE — 97140 MANUAL THERAPY 1/> REGIONS: CPT | Performed by: OCCUPATIONAL THERAPIST

## 2022-02-22 PROCEDURE — 97035 APP MDLTY 1+ULTRASOUND EA 15: CPT | Performed by: OCCUPATIONAL THERAPIST

## 2022-02-22 NOTE — PROGRESS NOTES
Daily Note     Today's date: 2022  Patient name: Daryel Alpers  : 1985  MRN: [id number]  Referring provider: Virlinda Landau, PA*  Dx:   Encounter Diagnosis     ICD-10-CM    1  Laceration of skin of left hand, subsequent encounter  S61 412D                   Subjective: I am doing better      Objective: See treatment diary below      Assessment: Tolerated treatment well  Patient scar is healed   Scar is mildly raised   He should continue splint use for work  Plan: Continue per plan of care        Precautions: universal       Manuals 2/15 2/22           graston scar  4m 5m           retrograde 4m 5m           Scar MOB  5             Elastomer scar  2m 2m           HEP  Scar management                                                                                                       Ther Ex                                                                                                                     Ther Activity                                       Gait Training                                       Modalities             Cont US scar   8m

## (undated) DEVICE — THE LARIAT SNARE IS AN ELECTROSURGICAL DEVICE USED TO ENDOSCOPICALLY GRASP, DISSECT, AND TRANSECT TISSUE DURING GASTROINTESTINAL (GI) ENDOSCOPIC PROCEDURES.  THE SNARE CAN BE USED WITH OR WITHOUT THE USE OF MONOPOLAR DIATHERMIC ENERGY.: Brand: LARIAT

## (undated) DEVICE — SINGLE-USE BIOPSY FORCEPS: Brand: RADIAL JAW 4